# Patient Record
Sex: FEMALE | Race: WHITE | Employment: FULL TIME | URBAN - METROPOLITAN AREA
[De-identification: names, ages, dates, MRNs, and addresses within clinical notes are randomized per-mention and may not be internally consistent; named-entity substitution may affect disease eponyms.]

---

## 2017-05-01 ENCOUNTER — OFFICE VISIT (OUTPATIENT)
Dept: NEUROLOGY | Age: 57
End: 2017-05-01

## 2017-05-01 VITALS
RESPIRATION RATE: 18 BRPM | DIASTOLIC BLOOD PRESSURE: 60 MMHG | BODY MASS INDEX: 21.31 KG/M2 | SYSTOLIC BLOOD PRESSURE: 100 MMHG | OXYGEN SATURATION: 98 % | HEIGHT: 68 IN | TEMPERATURE: 98.2 F | WEIGHT: 140.6 LBS | HEART RATE: 62 BPM

## 2017-05-01 DIAGNOSIS — H02.536 LID RETRACTION OF LEFT EYE: Primary | ICD-10-CM

## 2017-05-01 DIAGNOSIS — R20.0 LEFT FACIAL NUMBNESS: ICD-10-CM

## 2017-05-01 RX ORDER — MOMETASONE FUROATE 50 UG/1
2 SPRAY, METERED NASAL DAILY
COMMUNITY

## 2017-05-01 RX ORDER — MONTELUKAST SODIUM 10 MG/1
10 TABLET ORAL DAILY
COMMUNITY

## 2017-05-01 NOTE — PROGRESS NOTES
Neurology Consult      Subjective:      Mili Diop is a 49 y.o. 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 Said she saw me remotely at least 5 years ago and she had a left-sided Bell's palsy. Apparently had significant weakness but gradually improved over time. MRI of the brain done at the time apparently was unrevealing? Apparently saw the eye doctor and was okay? Did have some transient xerophthalmia with the palsy. Since that time has noticed that the left superior lid will retract and happens about once a month or so and no concomitant diplopia is experienced. Saw her eye doctor who was concerned about the mechanics of her eyelid and in an incredible twist of fate, both her dad and her paternal uncle have myasthenia gravis of the ocular type as I know the details? Patient also mentioned transient numbness of the left side of the face that will last for hours and then clear. Is also been evaluated for hearing loss on the right greater than left sides and was perhaps related to an autoimmune process? Has not had any recent ENT encounters but gave her the name of a respected ENT doctor in the community and she was aware of the practice and staff. Occasional note of transient L arm numbness which is isolated and otherwise not associated with pain or weakness issues. Has periodic neck pains, but so far not consistent with radicular direction. Current Outpatient Prescriptions   Medication Sig Dispense Refill    montelukast (SINGULAIR) 10 mg tablet Take 10 mg by mouth daily.  mometasone (NASONEX) 50 mcg/actuation nasal spray 2 Sprays daily.         No Known Allergies  Past Medical History:   Diagnosis Date    Headache     Hearing loss       Past Surgical History:   Procedure Laterality Date    HX  SECTION      HX CYST REMOVAL      neck      Social History     Social History    Marital status:      Spouse name: N/A    Number of children: N/A    Years of education: N/A     Occupational History    Not on file. Social History Main Topics    Smoking status: Never Smoker    Smokeless tobacco: Never Used    Alcohol use 1.8 oz/week     3 Glasses of wine per week    Drug use: No    Sexual activity: Yes     Other Topics Concern    Not on file     Social History Narrative    No narrative on file      Family History   Problem Relation Age of Onset    Heart Disease Father     Other Father      Myasthenia Gravis    Heart Disease Brother     Cancer Maternal Grandmother       Visit Vitals    /60    Pulse 62    Temp 98.2 °F (36.8 °C) (Oral)    Resp 18    Ht 5' 8\" (1.727 m)    Wt 63.8 kg (140 lb 9.6 oz)    SpO2 98%    BMI 21.38 kg/m2        Review of Systems:   A comprehensive review of systems was negative except for that written in the HPI. Neuro Exam:     Appearance: The patient is well developed, well nourished, provides a coherent history and is in no acute distress. Mental Status: Oriented to time, place and person. Mood and affect appropriate. Cranial Nerves:   Intact visual fields. Fundi are benign. PAUL, EOM's full, no nystagmus, no ptosis. Facial sensation is normal. Corneal reflexes are intact. Facial movement is symmetric. Hearing is normal bilaterally. Palate is midline with normal sternocleidomastoid and trapezius muscles are normal. Tongue is midline. No synkinesis seen with eye opening and closure. Motor:  5/5 strength in upper and lower proximal and distal muscles. Normal bulk and tone. No fasciculations. Reflexes:   Deep tendon reflexes 2+/4 and symmetrical.   Sensory:   Normal to touch, pinprick and vibration. Gait:  Normal gait. Tremor:   No tremor noted. Cerebellar:  No cerebellar signs present. Neurovascular:  Normal heart sounds and regular rhythm, peripheral pulses intact, and no carotid bruits. Tinel's over left wrist and elbow negative and Phalen's maneuver negative.   Range of motion of neck did not reproduce any left upper extremity symptoms. No neck pain currently. Assessment:   Problem 1 left lid issues as described. Patient is very anxious with her dad and paternal uncle with myasthenia gravis and ocular by type. Will check an acetylcholine receptor antibody panel. Transient left facial numbness as described. Will order a brain MRI as this occurs on a not infrequent basis and otherwise unexplained. Plan:   Revisit in about 1 month.   Signed by :  Lincoln Tanner MD

## 2017-05-01 NOTE — PATIENT INSTRUCTIONS
Information Regarding Testing     If you have physican order for a test or a medication denied by your insurance company, this does not mean the test or medication is not appropriate for you as that is a medical decision, not a decision to be made by an insurance company representative or by an [facility] physician who has not interviewed and examined you. This is a decision to be made between you and your physician. The denial of services is a contractual matter between you and your insurance company, not an issue between your physician and the insurance company. If your test or medication is denied, you can take the following steps to help resolve the issue:    1. File a complaint with the [facility] regarding your insurance company's denial of services ordered for you. You can do this either by calling them directly or by completing an on-line complaint form on the Collactive. This can be found at [email or web address]    2. Also file a formal complaint with your insurance company and ask to have the name of the person denying the service so that you may explore a legal option should you be harmed by this denial of service. Again, the fact the insurance company will not pay for the service does not mean it is not medically necessary and I would encourage you to follow through with the plan that was made with your physician    3. File a written complaint with your employer so your employer and benefit manager is aware of the poor coverage they are providing their employees. If you have medicare/medicaid, complain to your representative in the House and to your Micheal Mondragon. If we have ordered testing for you, we do not call patients with results and we do not give test results over the phone. We schedule follow up appointments so that your results can be discussed in person and any questions you have regarding them may be addressed.   If something of concern is revealed on your test, we will call you for a sooner follow up appointment. Additionally, results may be found by using the My Chart feature and one of our patient service representatives at the  can give you instructions on how to access this feature of our electronic medical record system. Learning About Rusty Sebastian  What is a living will? A living will is a legal form you use to write down the kind of care you want at the end of your life. It is used by the health professionals who will treat you if you aren't able to decide for yourself. If you put your wishes in writing, your loved ones and others will know what kind of care you want. They won't need to guess. This can ease your mind and be helpful to others. A living will is not the same as an estate or property will. An estate will explains what you want to happen with your money and property after you die. Is a living will a legal document? A living will is a legal document. Each state has its own laws about living grijalva. If you move to another state, make sure that your living will is legal in the state where you now live. Or you might use a universal form that has been approved by many states. This kind of form can sometimes be completed and stored online. Your electronic copy will then be available wherever you have a connection to the Internet. In most cases, doctors will respect your wishes even if you have a form from a different state. · You don't need an  to complete a living will. But legal advice can be helpful if your state's laws are unclear, your health history is complicated, or your family can't agree on what should be in your living will. · You can change your living will at any time. Some people find that their wishes about end-of-life care change as their health changes. · In addition to making a living will, think about completing a medical power of  form.  This form lets you name the person you want to make end-of-life treatment decisions for you (your \"health care agent\") if you're not able to. Many hospitals and nursing homes will give you the forms you need to complete a living will and a medical power of . · Your living will is used only if you can't make or communicate decisions for yourself anymore. If you become able to make decisions again, you can accept or refuse any treatment, no matter what you wrote in your living will. · Your state may offer an online registry. This is a place where you can store your living will online so the doctors and nurses who need to treat you can find it right away. What should you think about when creating a living will? Talk about your end-of-life wishes with your family members and your doctor. Let them know what you want. That way the people making decisions for you won't be surprised by your choices. Think about these questions as you make your living will:  · Do you know enough about life support methods that might be used? If not, talk to your doctor so you know what might be done if you can't breathe on your own, your heart stops, or you're unable to swallow. · What things would you still want to be able to do after you receive life-support methods? Would you want to be able to walk? To speak? To eat on your own? To live without the help of machines? · If you have a choice, where do you want to be cared for? In your home? At a hospital or nursing home? · Do you want certain Islam practices performed if you become very ill? · If you have a choice at the end of your life, where would you prefer to die? At home? In a hospital or nursing home? Somewhere else? · Would you prefer to be buried or cremated? · Do you want your organs to be donated after you die? What should you do with your living will? · Make sure that your family members and your health care agent have copies of your living will.   · Give your doctor a copy of your living will to keep in your medical record. If you have more than one doctor, make sure that each one has a copy. · You may want to put a copy of your living will where it can be easily found. Where can you learn more? Go to http://boni-hortensia.info/. Enter N398 in the search box to learn more about \"Learning About Living Keaton. \"  Current as of: February 24, 2016  Content Version: 11.2  © 0105-7849 Gamervision. Care instructions adapted under license by [employer] (which disclaims liability or warranty for this information). If you have questions about a medical condition or this instruction, always ask your healthcare professional. [address] any warranty or liability for your use of this information. Will recommend MRI of brain with transient left face numbness episodes as described. Check blood work with left lid issues and family history of myasthenia gravis. Revisit in about 1 month.

## 2017-05-01 NOTE — MR AVS SNAPSHOT
Visit Information Date & Time Provider Department Dept. Phone Encounter #  
 5/1/2017 10:00 AM Jose Deng MD Neurology [facility] [phone] [id number] Follow-up Instructions Return in about 1 month (around 6/1/2017). Upcoming Health Maintenance Date Due Hepatitis C Screening 1960 DTaP/Tdap/Td series (1 - Tdap) 9/19/1983 PAP AKA CERVICAL CYTOLOGY 9/19/1983 BREAST CANCER SCRN MAMMOGRAM 9/19/2012 FOBT Q 1 YEAR AGE 50-75 9/19/2012 INFLUENZA AGE 9 TO ADULT 8/1/2017 Allergies as of 5/1/2017  Never Reviewed No Known Allergies Current Immunizations  Never Reviewed No immunizations on file. Not reviewed this visit You Were Diagnosed With   
  
 Codes Comments Lid retraction of left eye    -  Primary ICD-10-CM: H02.536 ICD-9-CM: 374.41 Left facial numbness     ICD-10-CM: R20.0 ICD-9-CM: 840. 0 Vitals BP Pulse Temp Resp Height(growth percentile) Weight(growth percentile) 100/60 62 98.2 °F (36.8 °C) (Oral) 18 5' 8\" (1.727 m) 140 lb 9.6 oz (63.8 kg) SpO2 BMI OB Status Smoking Status 98% 21.38 kg/m2 Menopause Never Smoker Vitals History BMI and BSA Data Body Mass Index Body Surface Area  
 21.38 kg/m 2 1.75 m 2 Your Updated Medication List  
  
   
This list is accurate as of: 5/1/17 10:49 AM.  Always use your most recent med list.  
  
  
  
  
 NASONEX 50 mcg/actuation nasal spray Generic drug:  mometasone 2 Sprays daily. SINGULAIR 10 mg tablet Generic drug:  montelukast  
Take 10 mg by mouth daily. We Performed the Following ACETYLCHOLINE RECEPTOR PANEL [id number] CPT(R)] Follow-up Instructions Return in about 1 month (around 6/1/2017). To-Do List   
 05/08/2017 Imaging:  MRI BRAIN WO CONT Patient Instructions Information Regarding Testing If you have physican order for a test or a medication denied by your insurance company, this does not mean the test or medication is not appropriate for you as that is a medical decision, not a decision to be made by an insurance company representative or by an [address] [place] [address] who has not interviewed and examined you. This is a decision to be made between you and your physician. The denial of services is a contractual matter between you and your insurance company, not an issue between your physician and the insurance company. If your test or medication is denied, you can take the following steps to help resolve the issue: 1. File a complaint with the [facility] regarding your insurance company's denial of services ordered for you. You can do this either by calling them directly or by completing an on-line complaint form on the VenueBook. This can be found at [email or web address]. Also file a formal complaint with your insurance company and ask to have the name of the person denying the service so that you may explore a legal option should you be harmed by this denial of service. Again, the fact the insurance company will not pay for the service does not mean it is not medically necessary and I would encourage you to follow through with the plan that was made with your physician 3. File a written complaint with your employer so your employer and benefit manager is aware of the poor coverage they are providing their employees. If you have medicare/medicaid, complain to your representative in the House and to your Micheal Mondragon. If we have ordered testing for you, we do not call patients with results and we do not give test results over the phone. We schedule follow up appointments so that your results can be discussed in person and any questions you have regarding them may be addressed.   If something of concern is revealed on your test, we will call you for a sooner follow up appointment. Additionally, results may be found by using the My Chart feature and one of our patient service representatives at the  can give you instructions on how to access this feature of our electronic medical record system. [address]ihsel [address] What is a living will? A living will is a legal form you use to write down the kind of care you want at the end of your life. It is used by the health professionals who will treat you if you aren't able to decide for yourself. If you put your wishes in writing, your loved ones and others will know what kind of care you want. They won't need to guess. This can ease your mind and be helpful to others. A living will is not the same as an estate or property will. An estate will explains what you want to happen with your money and property after you die. Is a living will a legal document? A living will is a legal document. Each state has its own laws about living grijalva. If you move to another state, make sure that your living will is legal in the state where you now live. Or you might use a universal form that has been approved by many states. This kind of form can sometimes be completed and stored online. Your electronic copy will then be available wherever you have a connection to the Internet. In most cases, doctors will respect your wishes even if you have a form from a different state. · You don't need an  to complete a living will. But legal advice can be helpful if your state's laws are unclear, your health history is complicated, or your family can't agree on what should be in your living will. · You can change your living will at any time. Some people find that their wishes about end-of-life care change as their health changes. · In addition to making a living will, think about completing a medical power of  form.  This form lets you name the person you want to make end-of-life treatment decisions for you (your \"health care agent\") if you're not able to. Many hospitals and nursing homes will give you the forms you need to complete a living will and a medical power of . · Your living will is used only if you can't make or communicate decisions for yourself anymore. If you become able to make decisions again, you can accept or refuse any treatment, no matter what you wrote in your living will. · Your state may offer an online registry. This is a place where you can store your living will online so the doctors and nurses who need to treat you can find it right away. What should you think about when creating a living will? Talk about your end-of-life wishes with your family members and your doctor. Let them know what you want. That way the people making decisions for you won't be surprised by your choices. Think about these questions as you make your living will: · Do you know enough about life support methods that might be used? If not, talk to your doctor so you know what might be done if you can't breathe on your own, your heart stops, or you're unable to swallow. · What things would you still want to be able to do after you receive life-support methods? Would you want to be able to walk? To speak? To eat on your own? To live without the help of machines? · If you have a choice, where do you want to be cared for? In your home? At a hospital or nursing home? · Do you want certain Zoroastrian practices performed if you become very ill? · If you have a choice at the end of your life, where would you prefer to die? At home? In a hospital or nursing home? Somewhere else? · Would you prefer to be buried or cremated? · Do you want your organs to be donated after you die? What should you do with your living will? · Make sure that your family members and your health care agent have copies of your living will. · Give your doctor a copy of your living will to keep in your medical record. If you have more than one doctor, make sure that each one has a copy. · You may want to put a copy of your living will where it can be easily found. Where can you learn more? Go to http://boni-hortensia.info/. Enter Z058 in the search box to learn more about \"Learning About Living Keaton. \" Current as of: February 24, 2016 Content Version: 11.2 © 6391-9727 [email or web address]. Care instructions adapted under license by WinDensity (which disclaims liability or warranty for this information). If you have questions about a medical condition or this instruction, always ask your healthcare professional. [address] any warranty or liability for your use of this information. Will recommend MRI of brain with transient left face numbness episodes as described. Check blood work with left lid issues and family history of myasthenia gravis. Revisit in about 1 month. Introducing [facility]! [address] introduces Tru-Friends patient portal. Now you can access parts of your medical record, email your doctor's office, and request medication refills online. 1. In your internet browser, go to [email or web address] 2. Click on the First Time User? Click Here link in the Sign In box. You will see the New Member Sign Up page. 3. Enter your Tru-Friends Access Code exactly as it appears below. You will not need to use this code after youve completed the sign-up process. If you do not sign up before the expiration date, you must request a new code. · Tru-Friends Access Code: [email or web address] Expires: 7/30/2017 10:47 AM 
 
4. Enter the last four digits of your Social Security Number (xxxx) and Date of Birth (mm/dd/yyyy) as indicated and click Submit. You will be taken to the next sign-up page. 5. Create a Tru-Friends ID.  This will be your Tru-Friends login ID and cannot be changed, so think of one that is secure and easy to remember. 6. Create a SkyPower password. You can change your password at any time. 7. Enter your Password Reset Question and Answer. This can be used at a later time if you forget your password. 8. Enter your e-mail address. You will receive e-mail notification when new information is available in [address] 9. Click Sign Up. You can now view and download portions of your medical record. 10. Click the Download Summary menu link to download a portable copy of your medical information. If you have questions, please visit the Frequently Asked Questions section of the SkyPower website. Remember, SkyPower is NOT to be used for urgent needs. For medical emergencies, dial 911. Now available from your iPhone and Android! Please provide this summary of care documentation to your next provider. Your primary care clinician is listed as [address] [place] [address] Street. If you have any questions after today's visit, please call [phone].

## 2017-05-09 ENCOUNTER — HOSPITAL ENCOUNTER (OUTPATIENT)
Dept: MRI IMAGING | Age: 57
Discharge: HOME | End: 2017-05-09
Attending: SPECIALIST
Payer: COMMERCIAL

## 2017-05-09 DIAGNOSIS — R20.0 LEFT FACIAL NUMBNESS: ICD-10-CM

## 2017-05-09 PROCEDURE — 70551 MRI BRAIN STEM W/O DYE: CPT

## 2017-06-05 LAB
ACHR BIND AB SER-SCNC: <0.03 NMOL/L (ref 0–0.24)
ACHR BLOCK AB SER-ACNC: 14 % (ref 0–25)
ACHR MOD AB/ACHR TOTAL SFR SER: <12 % (ref 0–20)

## 2017-06-06 ENCOUNTER — OFFICE VISIT (OUTPATIENT)
Dept: NEUROLOGY | Age: 57
End: 2017-06-06

## 2017-06-06 VITALS — HEIGHT: 68 IN

## 2017-06-06 NOTE — MR AVS SNAPSHOT
Visit Information Date & Time Provider Department Dept. Phone Encounter #  
 6/6/2017 11:00 AM Lucila Fraga MD Neurology [place] [address] [phone] [id number] Upcoming Health Maintenance Date Due Hepatitis C Screening 1960 DTaP/Tdap/Td series (1 - Tdap) 9/19/1983 PAP AKA CERVICAL CYTOLOGY 9/19/1983 BREAST CANCER SCRN MAMMOGRAM 9/19/2012 FOBT Q 1 YEAR AGE 50-75 9/19/2012 INFLUENZA AGE 9 TO ADULT 8/1/2017 Allergies as of 6/6/2017  Review Complete On: 5/31/2017 By: Lucila Fraga MD  
 No Known Allergies Current Immunizations  Never Reviewed No immunizations on file. Not reviewed this visit Vitals Height(growth percentile) OB Status Smoking Status 5' 8\" (1.727 m) Menopause Never Smoker Your Updated Medication List  
  
   
This list is accurate as of: 6/6/17 11:45 AM.  Always use your most recent med list.  
  
  
  
  
 NASONEX 50 mcg/actuation nasal spray Generic drug:  mometasone 2 Sprays daily. SINGULAIR 10 mg tablet Generic drug:  montelukast  
Take 10 mg by mouth daily. Patient Instructions A Healthy Lifestyle: Care Instructions Your Care Instructions A healthy lifestyle can help you feel good, stay at a healthy weight, and have plenty of energy for both work and play. A healthy lifestyle is something you can share with your whole family. A healthy lifestyle also can lower your risk for serious health problems, such as high blood pressure, heart disease, and diabetes. You can follow a few steps listed below to improve your health and the health of your family. Follow-up care is a key part of your treatment and safety. Be sure to make and go to all appointments, and call your doctor if you are having problems. Its also a good idea to know your test results and keep a list of the medicines you take. How can you care for yourself at home? · Do not eat too much sugar, fat, or fast foods. You can still have dessert and treats now and then. The goal is moderation. · Start small to improve your eating habits. Pay attention to portion sizes, drink less juice and soda pop, and eat more fruits and vegetables. ¨ Eat a healthy amount of food. A 3-ounce serving of meat, for example, is about the size of a deck of cards. Fill the rest of your plate with vegetables and whole grains. ¨ Limit the amount of soda and sports drinks you have every day. Drink more water when you are thirsty. ¨ Eat at least 5 servings of fruits and vegetables every day. It may seem like a lot, but it is not hard to reach this goal. A serving or helping is 1 piece of fruit, 1 cup of vegetables, or 2 cups of leafy, raw vegetables. Have an apple or some carrot sticks as an afternoon snack instead of a candy bar. Try to have fruits and/or vegetables at every meal. 
· Make exercise part of your daily routine. You may want to start with simple activities, such as walking, bicycling, or slow swimming. Try to be active 30 to 60 minutes every day. You do not need to do all 30 to 60 minutes all at once. For example, you can exercise 3 times a day for 10 or 20 minutes. Moderate exercise is safe for most people, but it is always a good idea to talk to your doctor before starting an exercise program. 
· Keep moving. Candice Dies the lawn, work in the garden, or "[employer]". Take the stairs instead of the elevator at work. · If you smoke, quit. People who smoke have an increased risk for heart attack, stroke, cancer, and other lung illnesses. Quitting is hard, but there are ways to boost your chance of quitting tobacco for good. ¨ Use nicotine gum, patches, or lozenges. ¨ Ask your doctor about stop-smoking programs and medicines. ¨ Keep trying.  
In addition to reducing your risk of diseases in the future, you will notice some benefits soon after you stop using tobacco. If you have shortness of breath or asthma symptoms, they will likely get better within a few weeks after you quit. · Limit how much alcohol you drink. Moderate amounts of alcohol (up to 2 drinks a day for men, 1 drink a day for women) are okay. But drinking too much can lead to liver problems, high blood pressure, and other health problems. Family health If you have a family, there are many things you can do together to improve your health. · Eat meals together as a family as often as possible. · Eat healthy foods. This includes fruits, vegetables, lean meats and dairy, and whole grains. · Include your family in your fitness plan. Most people think of activities such as jogging or tennis as the way to fitness, but there are many ways you and your family can be more active. Anything that makes you breathe hard and gets your heart pumping is exercise. Here are some tips: 
¨ Walk to do errands or to take your child to school or the bus. ¨ Go for a family bike ride after dinner instead of watching TV. Where can you learn more? Go to http://boni-hortensia.info/. Enter I807 in the search box to learn more about \"A Healthy Lifestyle: Care Instructions. \" Current as of: July 26, 2016 Content Version: 11.2 © 2487-7443 [employer]. Care instructions adapted under license by [employer] (which disclaims liability or warranty for this information). If you have questions about a medical condition or this instruction, always ask your healthcare professional. [place] [address] any warranty or liability for your use of this information. Introducing [facility]! [facility] introduces [employer] patient portal. Now you can access parts of your medical record, email your doctor's office, and request medication refills online. 1. In your internet browser, go to [email or web address] 2. Click on the First Time User? Click Here link in the Sign In box. You will see the New Member Sign Up page. 3. Enter your [employer] Access Code exactly as it appears below. You will not need to use this code after youve completed the sign-up process. If you do not sign up before the expiration date, you must request a new code. · [employer] Access Code: [email or web address] Expires: 7/30/2017 10:47 AM 
 
4. Enter the last four digits of your Social Security Number (xxxx) and Date of Birth (mm/dd/yyyy) as indicated and click Submit. You will be taken to the next sign-up page. 5. Create a [employer] ID. This will be your [employer] login ID and cannot be changed, so think of one that is secure and easy to remember. 6. Create a [employer] password. You can change your password at any time. 7. Enter your Password Reset Question and Answer. This can be used at a later time if you forget your password. 8. Enter your e-mail address. You will receive e-mail notification when new information is available in [address] 9. Click Sign Up. You can now view and download portions of your medical record. 10. Click the Download Summary menu link to download a portable copy of your medical information. If you have questions, please visit the Frequently Asked Questions section of the [employer] website. Remember, [employer] is NOT to be used for urgent needs. For medical emergencies, dial 911. Now available from your iPhone and Android! Please provide this summary of care documentation to your next provider. Your primary care clinician is listed as [address]. If you have any questions after today's visit, please call [phone].

## 2017-06-06 NOTE — PATIENT INSTRUCTIONS

## 2018-08-21 ENCOUNTER — HOSPITAL ENCOUNTER (OUTPATIENT)
Dept: MAMMOGRAPHY | Age: 58
Discharge: HOME | End: 2018-08-21
Attending: FAMILY MEDICINE

## 2018-08-21 DIAGNOSIS — R92.1 BREAST CALCIFICATION, RIGHT: ICD-10-CM

## 2018-08-23 ENCOUNTER — HOSPITAL ENCOUNTER (OUTPATIENT)
Dept: MAMMOGRAPHY | Age: 58
Discharge: HOME | End: 2018-08-23
Attending: FAMILY MEDICINE
Payer: COMMERCIAL

## 2018-08-23 ENCOUNTER — HOSPITAL ENCOUNTER (OUTPATIENT)
Dept: MAMMOGRAPHY | Age: 58
End: 2018-08-23
Attending: FAMILY MEDICINE
Payer: COMMERCIAL

## 2018-08-23 PROCEDURE — 77065 DX MAMMO INCL CAD UNI: CPT

## 2019-12-30 ENCOUNTER — OFFICE VISIT (OUTPATIENT)
Dept: CARDIOLOGY CLINIC | Age: 59
End: 2019-12-30

## 2019-12-30 VITALS — BODY MASS INDEX: 21.98 KG/M2 | HEIGHT: 68 IN | WEIGHT: 145 LBS | RESPIRATION RATE: 16 BRPM

## 2019-12-30 DIAGNOSIS — Q21.0 VSD (VENTRICULAR SEPTAL DEFECT): Primary | ICD-10-CM

## 2019-12-30 DIAGNOSIS — R42 DIZZINESS: ICD-10-CM

## 2019-12-30 DIAGNOSIS — R07.9 CHEST PAIN, UNSPECIFIED TYPE: ICD-10-CM

## 2019-12-30 RX ORDER — DICLOFENAC SODIUM 10 MG/G
GEL TOPICAL 4 TIMES DAILY
COMMUNITY

## 2019-12-30 NOTE — PROGRESS NOTES
Reason for Consult: Chest pain, Discomfort, lightheadedness. HPI: Zahra Esposito is a 64 y.o. female With past medical history significant for VSD. Previously seen by Dr. Belem Lugo in years ago. No old records available for review. VSD apparently has spontaneously closed in the last few years. She is returning today for follow-up as an inpatient for new symptoms of chest pain, palpitations. Symptoms started few weeks ago. Symptoms been described as having right anterior chest wall pain as a band of pain across the anterior chest wall nonradiating but has also felt left arm pain and numbness in the past.  Symptoms are nonexertional.  Associated symptoms include heart racing sensation scribed as palpitations. No symptoms of shortness of breath, fever, chills, cough, abdominal pain, diarrhea, or dysuria. EKG in the office today demonstrated normal sinus rhythm, normal axis, normal intervals, normal ST segment. Plan:    1. Chest pain: Symptoms are atypical for angina. Further evaluation with stress echocardiogram.  Check coronary calcium score for preventive work-up. 2.  Palpitations: EKG appears normal.  Further evaluation with 24-hour Holter monitor. 3.  Known history of VSD: No murmur audible. Check echocardiogram for further evaluation if there is any residual effects of the VSD. ATTENTION:   This medical record was transcribed using an electronic medical records/speech recognition system. Although proofread, it may and can contain electronic, spelling and other errors. Corrections may be executed at a later time. Please feel free to contact us for any clarifications as needed.       Past Medical History:   Diagnosis Date    Acid reflux     Headache     Hearing loss     VSD (ventricular septal defect)             Past Surgical History:   Procedure Laterality Date    HX  SECTION      HX CYST REMOVAL      neck             Family History   Problem Relation Age of Onset    Heart Disease Father     Other Father         Myasthenia Gravis    Heart Disease Brother     Cancer Maternal Grandmother            Social History     Socioeconomic History    Marital status:      Spouse name: Not on file    Number of children: Not on file    Years of education: Not on file    Highest education level: Not on file   Occupational History    Not on file   Social Needs    Financial resource strain: Not on file    Food insecurity:     Worry: Not on file     Inability: Not on file    Transportation needs:     Medical: Not on file     Non-medical: Not on file   Tobacco Use    Smoking status: Never Smoker    Smokeless tobacco: Never Used   Substance and Sexual Activity    Alcohol use: Yes     Alcohol/week: 3.0 standard drinks     Types: 3 Glasses of wine per week    Drug use: No    Sexual activity: Yes   Lifestyle    Physical activity:     Days per week: Not on file     Minutes per session: Not on file    Stress: Not on file   Relationships    Social connections:     Talks on phone: Not on file     Gets together: Not on file     Attends Church service: Not on file     Active member of club or organization: Not on file     Attends meetings of clubs or organizations: Not on file     Relationship status: Not on file    Intimate partner violence:     Fear of current or ex partner: Not on file     Emotionally abused: Not on file     Physically abused: Not on file     Forced sexual activity: Not on file   Other Topics Concern    Not on file   Social History Narrative    Not on file         No Known Allergies         Current Outpatient Medications   Medication Sig Dispense Refill    diclofenac (VOLTAREN) 1 % gel Apply  to affected area four (4) times daily.  montelukast (SINGULAIR) 10 mg tablet Take 10 mg by mouth daily.  mometasone (NASONEX) 50 mcg/actuation nasal spray 2 Sprays daily. ROS:  12 point review of systems was performed.  All negative except for HPI     Physical Exam:  Visit Vitals  Resp 16   Ht 5' 8\" (1.727 m)   Wt 145 lb (65.8 kg)   BMI 22.05 kg/m²       Gen:  Well-developed, well-nourished, in no acute distress  HEENT:  Pink conjunctivae, PERRL, hearing intact to voice, moist mucous membranes  Neck:  Supple, without masses, thyroid non-tender  Resp:  No accessory muscle use, clear breath sounds without wheezes rales or rhonchi  Card:  No murmurs, normal S1, S2 without thrills, bruits or peripheral edema  Abd:  Soft, non-tender, non-distended, normoactive bowel sounds are present, no palpable organomegaly and no detectable hernias  Lymph:  No cervical or inguinal adenopathy  Musc:  No cyanosis or clubbing  Skin:  No rashes or ulcers, skin turgor is good  Neuro:  Cranial nerves are grossly intact, no focal motor weakness, follows commands appropriately  Psych:  Good insight, oriented to person, place and time, alert     Labs:     No results found for: WBC, WBCT, WBCPOC, HGB, HGBPOC, HCT, HCTPOC, PLT, PLTPOC, MCV, MCVPOC, HGBEXT, HCTEXT, PLTEXT  No results found for: HBA1C, KAA5VIIR, HGBE8, GLU, GESTF, GLUCPOC, MCACR, MCA1, MCA2, MCA3, MCAU, LDL, LDLC, DLDLP, COLT, CREAPOC, ACREA, CREA, REFC3, REFC4, ENB2XWGO   No results found for: CHOL, CHOLPOCT, HDL, LDL, LDLC, LDLCPOC, LDLCEXT, TRIGL, TGLPOCT, CHHD, CHHDX  No results found for: GPT, ALTPOC, ALT, SGOT, ASTPOC, GGT, AP, APIT, APX, CBIL, TBIL, TBILI, ALB, ALBPOC, TP, NH3, NH4, INR, INREXT, PTP, PTINR, PTEXT, PLT, PLTPOC, HCABQL, HBSAG, AFP, INREXT, PTEXT, PLTEXT  No results found for: INR, INREXT, PTMR, PTP, PT1, PT2, INREXT   No results found for: GFRNA, GFRNAPOC, GFRAA, GFRAAPOC, CREA, CREAPOC, BUN, IBUN, BUNPOC, NA, NAPOC, K, KPOCT, CL, CLPOC, CO2, CO2POC, MG, PHOS, ALBEU, PTH, PTHILT, EPO  No results found for: PSA, PSA2, PSAR1, PSA1, PSAR2, PSA3, PSAR3, [id number], [id number], PSALT  No results found for: TSH, TSH2, TSH3, TSHP, TSHELE, TSHEXT, TT3, T3U, T3UP, FRT3, FT3, FT4, FT4P, T4, T4P, FT4T, TT7, TSHEXT   No results found for: GLU, GLUCPOC   No results found for: CPK, RCK1, RCK2, RCK3, RCK4, CKMB, CKNDX, CKND1, TROPT, TROIQ, BNPP, BNP   No results found for: BNP, BNPP, BNPPPOC, XBNPT, BNPNT   No results found for: NA, K, CL, CO2, AGAP, GLU, BUN, CREA, BUCR, GFRAA, GFRNA, CA, GFRAA   No results found for: NA, K, CL, CO2, AGAP, GLU, BUN, CREA, BUCR, GFRAA, GFRNA, CA, TBIL, TBILI, GPT, ALT, SGOT, AP, TP, ALB, GLOB, AGRAT   No results found for: HBA1C, [id number], HGBE8, HFT0EFPD, YAC5FJWK      No results for input(s): CPK, CKMB, TROIQ in the last 72 hours. No lab exists for component: CKQMB, CPKMB        Problem List:     Problem List  Date Reviewed: 12/30/2019    None            Billy Ramos MD, [facility]

## 2019-12-30 NOTE — PROGRESS NOTES
Chief Complaint   Patient presents with    New Patient    Chest Pain     Visit Vitals  Resp 16   Ht 5' 8\" (1.727 m)   Wt 145 lb (65.8 kg)   BMI 22.05 kg/m²     Extended / Orthostatic Vitals:  Patient Position 2: Supine  BP 2: 108/60  Pulse 2: 68  Patient Position 3: Standing  BP 3: 106/58  Pulse 3: 82  Patient Position 4: Standing  BP 4: 100/56  Pulse 4: 76    Patient presents in office with c/o recent right-sided chest discomfort, lightheadedness with position changes, and occasional palpitations or \"fluttering\" that goes away. Chest discomfort with over exertion and cycling. No recent hospital visits.

## 2019-12-30 NOTE — PATIENT INSTRUCTIONS
Stress Echo- Chest pain. Echo- VSD  25 Hr Holter monitor- Palpitations  CAC scoring  See Dr. Jose Guy in 1 month.

## 2020-01-02 ENCOUNTER — CLINICAL SUPPORT (OUTPATIENT)
Dept: CARDIOLOGY CLINIC | Age: 60
End: 2020-01-02

## 2020-01-02 NOTE — PROGRESS NOTES
1/3/20  N/c as holter had to be taken off when came for stress on 1/3/20 & not enough time for complete test.. tamra      Applied 24 hr holter per Dr Armando Toussaint dx: palps. Pt has #[id number]   Chargeable visit.   [facility]

## 2020-01-03 ENCOUNTER — TELEPHONE (OUTPATIENT)
Dept: CARDIOLOGY CLINIC | Age: 60
End: 2020-01-03

## 2020-01-03 ENCOUNTER — CLINICAL SUPPORT (OUTPATIENT)
Dept: CARDIOLOGY CLINIC | Age: 60
End: 2020-01-03

## 2020-01-03 DIAGNOSIS — R00.2 PALPITATIONS: Primary | ICD-10-CM

## 2020-01-03 NOTE — PROGRESS NOTES
Applied 24 hr holter per Dr Lili Spencer dx: palps. Pt has #[id number]   Chargeable visit. [employer]    This is a reapply since monitor that was applied yest afternoon was removed for stress test done today. Marked yest holter as a n/c & will bill for todays.

## 2020-01-03 NOTE — TELEPHONE ENCOUNTER
----- Message from Marvin Guerrero MD sent at 1/3/2020  3:39 PM EST -----  Pls notify>> no VSD seen. Normal Echo.

## 2020-01-06 NOTE — TELEPHONE ENCOUNTER
Patient called into office concerning her recent testing and results (Patient [id number]). Advised per VO of Dr. Billie Olivera that results are normal.    Patient asked about the LBBB that occurred during testing. This nurse discussed briefly with patient about the \"transient LBBB\" and that she returned to normal rhythm shortly after in recovery. Also, will await holter results. Patient would like Dr. Billie Olivera to call her back to discuss transient LBBB.

## 2020-01-09 ENCOUNTER — TELEPHONE (OUTPATIENT)
Dept: CARDIOLOGY CLINIC | Age: 60
End: 2020-01-09

## 2020-01-09 NOTE — TELEPHONE ENCOUNTER
Patient is calling to inquire about her holter monitor results. Patient states that she wants a call from Dr. Leticia Myers. Please advise.      Phone: [phone]

## 2020-01-10 NOTE — TELEPHONE ENCOUNTER
Patient wants to speak with Dr. Concetta Celestin in regards to her holter monitor. She has a lot of questions and expects to here back from Dr. Concetta Celestin.  Please advise    [phone]

## 2020-02-03 ENCOUNTER — TELEPHONE (OUTPATIENT)
Dept: CARDIOLOGY CLINIC | Age: 60
End: 2020-02-03

## 2020-02-04 NOTE — TELEPHONE ENCOUNTER
Patient is returning Dr. Chloé Vasquez call. She is inquiring about her holter results. Please advise.     Phone #: [phone]  Thanks

## 2020-02-17 NOTE — TELEPHONE ENCOUNTER
Patient would really like to speak with nurse about Holter Monitor Results. Please call back at you earliest convenience.      Phone: [phone]

## 2020-02-18 NOTE — TELEPHONE ENCOUNTER
[facility] for patient to call back concerning holter results. Dr. Armida Hodgson had called 2/3/2020 concerning her hotler results. Results: Normal holter testing. Dr. Armida Hodgson recommends that patient decrease any caffeine intake, maintain proper hydration, and decrease stress. She can follow up as needed for worsening symptoms.

## 2020-10-16 ENCOUNTER — TRANSCRIBE ORDER (OUTPATIENT)
Dept: SCHEDULING | Age: 60
End: 2020-10-16

## 2020-10-16 DIAGNOSIS — J32.4 CHRONIC PANSINUSITIS: Primary | ICD-10-CM

## 2020-11-18 ENCOUNTER — HOSPITAL ENCOUNTER (OUTPATIENT)
Dept: CT IMAGING | Age: 60
Discharge: HOME | End: 2020-11-18
Attending: OTOLARYNGOLOGY
Payer: COMMERCIAL

## 2020-11-18 DIAGNOSIS — J32.4 CHRONIC PANSINUSITIS: ICD-10-CM

## 2020-11-18 PROCEDURE — 70486 CT MAXILLOFACIAL W/O DYE: CPT | Performed by: OTOLARYNGOLOGY

## 2021-10-14 ENCOUNTER — TRANSCRIBE ORDER (OUTPATIENT)
Dept: SCHEDULING | Age: 61
End: 2021-10-14

## 2021-10-14 DIAGNOSIS — M54.16 LUMBAR RADICULITIS: Primary | ICD-10-CM

## 2021-11-03 ENCOUNTER — HOSPITAL ENCOUNTER (OUTPATIENT)
Dept: MRI IMAGING | Age: 61
Discharge: HOME | End: 2021-11-03
Attending: ORTHOPAEDIC SURGERY
Payer: COMMERCIAL

## 2021-11-03 DIAGNOSIS — M54.16 LUMBAR RADICULITIS: ICD-10-CM

## 2021-11-03 PROCEDURE — 72148 MRI LUMBAR SPINE W/O DYE: CPT

## 2021-11-19 ENCOUNTER — TRANSCRIBE ORDER (OUTPATIENT)
Dept: SCHEDULING | Age: 61
End: 2021-11-19

## 2021-11-19 DIAGNOSIS — J32.1 CHRONIC FRONTAL SINUSITIS: Primary | ICD-10-CM

## 2021-12-01 ENCOUNTER — HOSPITAL ENCOUNTER (OUTPATIENT)
Dept: CT IMAGING | Age: 61
Discharge: HOME | End: 2021-12-01
Attending: OTOLARYNGOLOGY
Payer: COMMERCIAL

## 2021-12-01 DIAGNOSIS — J32.1 CHRONIC FRONTAL SINUSITIS: ICD-10-CM

## 2021-12-01 PROCEDURE — 70486 CT MAXILLOFACIAL W/O DYE: CPT

## 2023-05-11 RX ORDER — MOMETASONE FUROATE 50 UG/1
2 SPRAY, METERED NASAL DAILY
COMMUNITY

## 2023-05-11 RX ORDER — MONTELUKAST SODIUM 10 MG/1
10 TABLET ORAL DAILY
COMMUNITY

## 2024-02-23 ENCOUNTER — OFFICE VISIT (OUTPATIENT)
Age: 64
End: 2024-02-23

## 2024-02-23 VITALS
TEMPERATURE: 98.8 F | HEART RATE: 80 BPM | DIASTOLIC BLOOD PRESSURE: 62 MMHG | BODY MASS INDEX: 21.37 KG/M2 | OXYGEN SATURATION: 99 % | SYSTOLIC BLOOD PRESSURE: 114 MMHG | RESPIRATION RATE: 16 BRPM | HEIGHT: 68 IN | WEIGHT: 141 LBS

## 2024-02-23 DIAGNOSIS — R52 BODY ACHES: Primary | ICD-10-CM

## 2024-02-23 DIAGNOSIS — J02.0 STREP PHARYNGITIS: ICD-10-CM

## 2024-02-23 PROBLEM — H25.13 AGE-RELATED NUCLEAR CATARACT OF BOTH EYES: Status: ACTIVE | Noted: 2024-02-23

## 2024-02-23 PROBLEM — H43.392 VITREOUS FLOATERS, LEFT: Status: ACTIVE | Noted: 2024-02-23

## 2024-02-23 PROBLEM — H52.4 PRESBYOPIA OF BOTH EYES: Status: ACTIVE | Noted: 2024-02-23

## 2024-02-23 LAB
QUICKVUE INFLUENZA TEST: NEGATIVE
STREP PYOGENES DNA, POC: POSITIVE
VALID INTERNAL CONTROL, POC: ABNORMAL
VALID INTERNAL CONTROL, POC: NORMAL

## 2024-02-23 RX ORDER — PANTOPRAZOLE SODIUM 20 MG/1
20 TABLET, DELAYED RELEASE ORAL DAILY
COMMUNITY

## 2024-02-23 RX ORDER — AMOXICILLIN AND CLAVULANATE POTASSIUM 875; 125 MG/1; MG/1
1 TABLET, FILM COATED ORAL 2 TIMES DAILY
Qty: 20 TABLET | Refills: 0 | Status: SHIPPED | OUTPATIENT
Start: 2024-02-23 | End: 2024-03-04

## 2024-02-23 ASSESSMENT — ENCOUNTER SYMPTOMS
NAUSEA: 0
BACK PAIN: 1
ABDOMINAL DISTENTION: 0
RHINORRHEA: 1
FACIAL SWELLING: 0
EYE DISCHARGE: 0
COUGH: 1
EYE ITCHING: 0
CHEST TIGHTNESS: 0
EYE REDNESS: 0
TROUBLE SWALLOWING: 1
CONSTIPATION: 0
WHEEZING: 0
SORE THROAT: 1
STRIDOR: 0
SHORTNESS OF BREATH: 0
VOICE CHANGE: 1
ABDOMINAL PAIN: 0
VOMITING: 0
EYE PAIN: 0
PHOTOPHOBIA: 0
SINUS PAIN: 0
SINUS PRESSURE: 0
DIARRHEA: 1

## 2024-02-23 NOTE — PATIENT INSTRUCTIONS
Contagious until on antibiotics for 24 hours.     Change toothbrush after 24 hours.     Warm salt water gargles.  Throat lozenges.  Warm liquids.  Anesthetic sprays or lozenges.  Tylenol or Motrin as directed as needed for pain.

## 2024-02-23 NOTE — PROGRESS NOTES
Saji Zavaleta (:  1960) is a 63 y.o. female,New patient, here for evaluation of the following chief complaint(s):  Sore Throat (Started Tuesday. Worsening symptoms with back pain and diarrhea )      ASSESSMENT/PLAN:  Visit Diagnoses and Associated Orders       Body aches    -  Primary    AMB POC STREP GO A DIRECT, DNA PROBE [52919 CPT(R)]      AMB POC RAPID INFLUENZA TEST [40263 CPT(R)]           ORDERS WITHOUT AN ASSOCIATED DIAGNOSIS    pantoprazole (PROTONIX) 20 MG tablet [23851]               Abx as directed. OTC sx management as directed.   Discussed s/sx to monitor for.   Follow up in 5-7 days if symptoms persist or if symptoms worsen.    SUBJECTIVE/OBJECTIVE:  HPI     63 y.o. female presents with symptoms of ST.  C/o ST, Diarrhea, Back pain since Tuesday.     Works in an elementary school.        Review of Systems   Constitutional:  Positive for appetite change (no appetite) and chills (Wed night). Negative for activity change, diaphoresis, fatigue and fever.   HENT:  Positive for congestion, ear pain (mild), hearing loss (hearing aides bilaterally), postnasal drip, rhinorrhea, sore throat, trouble swallowing and voice change. Negative for ear discharge, facial swelling, sinus pressure and sinus pain.    Eyes:  Negative for photophobia, pain, discharge, redness, itching and visual disturbance.   Respiratory:  Positive for cough (mostly at night). Negative for chest tightness, shortness of breath, wheezing and stridor.    Cardiovascular:  Negative for chest pain.   Gastrointestinal:  Positive for diarrhea. Negative for abdominal distention, abdominal pain, constipation, nausea and vomiting.   Musculoskeletal:  Positive for back pain and myalgias.   Allergic/Immunologic: Negative for environmental allergies and immunocompromised state.   Neurological:  Positive for headaches. Negative for dizziness and light-headedness.   Hematological:  Positive for adenopathy.         Vitals:    24 1357

## 2024-05-29 ENCOUNTER — OFFICE VISIT (OUTPATIENT)
Age: 64
End: 2024-05-29

## 2024-05-29 VITALS
BODY MASS INDEX: 21.34 KG/M2 | TEMPERATURE: 98 F | OXYGEN SATURATION: 99 % | SYSTOLIC BLOOD PRESSURE: 113 MMHG | HEIGHT: 68 IN | HEART RATE: 67 BPM | WEIGHT: 140.8 LBS | RESPIRATION RATE: 20 BRPM | DIASTOLIC BLOOD PRESSURE: 66 MMHG

## 2024-05-29 DIAGNOSIS — J06.9 UPPER RESPIRATORY INFECTION, VIRAL: Primary | ICD-10-CM

## 2024-05-29 DIAGNOSIS — J02.9 SORE THROAT: ICD-10-CM

## 2024-05-29 LAB
INFLUENZA A ANTIGEN, POC: NEGATIVE
INFLUENZA B ANTIGEN, POC: NEGATIVE
Lab: NORMAL
PERFORMING INSTRUMENT: NORMAL
QC PASS/FAIL: NORMAL
SARS-COV-2, POC: NORMAL
STREP PYOGENES DNA, POC: NEGATIVE
VALID INTERNAL CONTROL, POC: NORMAL
VALID INTERNAL CONTROL, POC: YES

## 2024-05-29 ASSESSMENT — ENCOUNTER SYMPTOMS
NAUSEA: 1
SORE THROAT: 1
ALLERGIC/IMMUNOLOGIC NEGATIVE: 1
DIARRHEA: 1
COUGH: 1
EYES NEGATIVE: 1

## 2024-05-29 NOTE — PROGRESS NOTES
2024   Saji Zavaleta (: 1960) is a 63 y.o. female, New patient, here for evaluation of the following chief complaint(s):  Pharyngitis (Acute onset of pharyngitis 2 days ago w/ fever and body aches. Tried gargled w/ salt water this AM and brought slight relief. )     ASSESSMENT/PLAN:  Below is the assessment and plan developed based on review of pertinent history, physical exam, labs, studies, and medications.  1. Upper respiratory infection, viral  2. Sore throat  -     AMB POC STREP GO A DIRECT, DNA PROBE  -     AMB POC INFLUENZA A  AND B REAL-TIME RT-PCR  -     POCT COVID-19, Antigen         Handout given with care instructions  2. OTC for symptom management. Increase fluid intake, ensure adequate nutritional intake.  3. Follow up with PCP as needed.  4. Go to ED with development of any acute symptoms.     Follow up:  Return if symptoms worsen or fail to improve.  Follow up immediately for any new, worsening or changes or if symptoms are not improving over the next 5-7 days.     SUBJECTIVE/OBJECTIVE:     Saji Zavaleta is a 63 y.o. female who complains of congestion, sore throat, post nasal drip, dry cough, myalgias, headache, fever, and chills for 2 days. She denies a history of chest pain, dizziness, shortness of breath, and vomiting and denies a history of asthma.  Patient has environmental/ seasonal allergies. Patient denies exposure to smoke / cigarettes.Patient denies exposure to  sick contacts . Patient also noted that OTC remedies did not help. PROGRESSION: STABLE SYMPTOMS      Diagnoses and all orders for this visit:  Sore throat  -     AMB POC STREP GO A DIRECT, DNA PROBE      Pharyngitis (Acute onset of pharyngitis 2 days ago w/ fever and body aches. Tried gargled w/ salt water this AM and brought slight relief. )      Results for orders placed or performed in visit on 24   AMB POC STREP GO A DIRECT, DNA PROBE   Result Value Ref Range    Valid Internal Control, POC Valid

## 2024-11-24 ENCOUNTER — OFFICE VISIT (OUTPATIENT)
Age: 64
End: 2024-11-24

## 2024-11-24 VITALS
RESPIRATION RATE: 18 BRPM | OXYGEN SATURATION: 98 % | TEMPERATURE: 98.6 F | HEIGHT: 68 IN | WEIGHT: 147.4 LBS | SYSTOLIC BLOOD PRESSURE: 119 MMHG | HEART RATE: 78 BPM | BODY MASS INDEX: 22.34 KG/M2 | DIASTOLIC BLOOD PRESSURE: 64 MMHG

## 2024-11-24 DIAGNOSIS — B96.89 ACUTE BACTERIAL SINUSITIS: Primary | ICD-10-CM

## 2024-11-24 DIAGNOSIS — R05.1 ACUTE COUGH: ICD-10-CM

## 2024-11-24 DIAGNOSIS — J01.90 ACUTE BACTERIAL SINUSITIS: Primary | ICD-10-CM

## 2024-11-24 RX ORDER — BENZONATATE 200 MG/1
200 CAPSULE ORAL 3 TIMES DAILY PRN
Qty: 21 CAPSULE | Refills: 0 | Status: SHIPPED | OUTPATIENT
Start: 2024-11-24 | End: 2024-12-01

## 2024-11-24 RX ORDER — DEXTROMETHORPHAN HYDROBROMIDE AND PROMETHAZINE HYDROCHLORIDE 15; 6.25 MG/5ML; MG/5ML
5 SYRUP ORAL 4 TIMES DAILY PRN
Qty: 140 ML | Refills: 0 | Status: SHIPPED | OUTPATIENT
Start: 2024-11-24 | End: 2024-12-01

## 2024-11-24 RX ORDER — ALBUTEROL SULFATE 90 UG/1
2 INHALANT RESPIRATORY (INHALATION) 4 TIMES DAILY PRN
Qty: 18 G | Refills: 0 | Status: SHIPPED | OUTPATIENT
Start: 2024-11-24

## 2025-06-30 ENCOUNTER — TRANSCRIBE ORDERS (OUTPATIENT)
Facility: HOSPITAL | Age: 65
End: 2025-06-30

## 2025-06-30 DIAGNOSIS — R13.10 DYSPHAGIA, UNSPECIFIED TYPE: ICD-10-CM

## 2025-06-30 DIAGNOSIS — R01.1 HEART MURMUR: ICD-10-CM

## 2025-06-30 DIAGNOSIS — K21.9 GASTROESOPHAGEAL REFLUX DISEASE, UNSPECIFIED WHETHER ESOPHAGITIS PRESENT: Primary | ICD-10-CM

## 2025-07-16 ENCOUNTER — HOSPITAL ENCOUNTER (OUTPATIENT)
Facility: HOSPITAL | Age: 65
Discharge: HOME | End: 2025-07-19
Payer: COMMERCIAL

## 2025-07-16 DIAGNOSIS — R01.1 HEART MURMUR: ICD-10-CM

## 2025-07-16 DIAGNOSIS — K21.9 GASTROESOPHAGEAL REFLUX DISEASE, UNSPECIFIED WHETHER ESOPHAGITIS PRESENT: ICD-10-CM

## 2025-07-16 DIAGNOSIS — R13.10 DYSPHAGIA, UNSPECIFIED TYPE: ICD-10-CM

## 2025-07-16 PROCEDURE — 74220 X-RAY XM ESOPHAGUS 1CNTRST: CPT
